# Patient Record
Sex: FEMALE | RURAL
[De-identification: names, ages, dates, MRNs, and addresses within clinical notes are randomized per-mention and may not be internally consistent; named-entity substitution may affect disease eponyms.]

---

## 2020-09-22 ENCOUNTER — OFFICE VISIT (OUTPATIENT)
Dept: RURAL CLINIC 6 | Facility: CLINIC | Age: 40
End: 2020-09-22

## 2020-09-24 ENCOUNTER — DASHBOARD ENCOUNTERS (OUTPATIENT)
Age: 40
End: 2020-09-24

## 2020-09-24 ENCOUNTER — TELEPHONE ENCOUNTER (OUTPATIENT)
Dept: URBAN - METROPOLITAN AREA CLINIC 92 | Facility: CLINIC | Age: 40
End: 2020-09-24

## 2020-09-24 ENCOUNTER — OFFICE VISIT (OUTPATIENT)
Dept: URBAN - NONMETROPOLITAN AREA CLINIC 2 | Facility: CLINIC | Age: 40
End: 2020-09-24
Payer: COMMERCIAL

## 2020-09-24 DIAGNOSIS — K59.01 CONSTIPATION: ICD-10-CM

## 2020-09-24 DIAGNOSIS — F41.0 PANIC ATTACKS: ICD-10-CM

## 2020-09-24 DIAGNOSIS — Z87.19 HISTORY OF ANAL FISSURES: ICD-10-CM

## 2020-09-24 DIAGNOSIS — L29.0 PRURITUS ANI: ICD-10-CM

## 2020-09-24 DIAGNOSIS — E80.4 GILBERT'S DISEASE: ICD-10-CM

## 2020-09-24 DIAGNOSIS — R19.8 DEFECATION SYMPTOM: ICD-10-CM

## 2020-09-24 DIAGNOSIS — Z86.19 HISTORY OF PARASITIC INFECTION: ICD-10-CM

## 2020-09-24 PROBLEM — 27503000 GILBERT'S DISEASE: Status: ACTIVE | Noted: 2020-09-24

## 2020-09-24 PROBLEM — 371631005 PANIC DISORDER: Status: ACTIVE | Noted: 2020-09-24

## 2020-09-24 PROBLEM — 161413004 HISTORY OF INFECTIOUS DISEASE: Status: ACTIVE | Noted: 2020-09-24

## 2020-09-24 PROBLEM — 90446007 PRURITUS ANI: Status: ACTIVE | Noted: 2020-09-24

## 2020-09-24 PROBLEM — 266997008 HISTORY OF GASTROINTESTINAL DISEASE: Status: ACTIVE | Noted: 2020-09-24

## 2020-09-24 PROCEDURE — G8427 DOCREV CUR MEDS BY ELIG CLIN: HCPCS | Performed by: INTERNAL MEDICINE

## 2020-09-24 PROCEDURE — 99203 OFFICE O/P NEW LOW 30 MIN: CPT | Performed by: INTERNAL MEDICINE

## 2020-09-24 PROCEDURE — G8420 CALC BMI NORM PARAMETERS: HCPCS | Performed by: INTERNAL MEDICINE

## 2020-09-24 PROCEDURE — G9903 PT SCRN TBCO ID AS NON USER: HCPCS | Performed by: INTERNAL MEDICINE

## 2020-09-24 PROCEDURE — 1036F TOBACCO NON-USER: CPT | Performed by: INTERNAL MEDICINE

## 2020-09-24 RX ORDER — HYDROCORTISONE 25 MG/G
1 APPLICATION CREAM TOPICAL TWICE A DAY
Qty: 1 | Refills: 0 | OUTPATIENT
Start: 2020-09-24 | End: 2020-10-24

## 2020-09-24 RX ORDER — LUBIPROSTONE 8 UG/1
1 CAPSULE WITH FOOD AND WATER CAPSULE, GELATIN COATED ORAL TWICE A DAY
Qty: 60 CAPSULE | Refills: 0 | OUTPATIENT
Start: 2020-09-24 | End: 2020-10-24

## 2020-09-24 RX ORDER — SERTRALINE 50 MG/1
1 TABLET TABLET, FILM COATED ORAL ONCE A DAY
Status: ACTIVE | COMMUNITY

## 2020-09-24 RX ORDER — ALPRAZOLAM 0.5 MG/1
AS DIRECTED TABLET ORAL PRN
Status: ACTIVE | COMMUNITY

## 2020-09-24 NOTE — HPI-TODAY'S VISIT:
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 anti fungal and steroidal creams wo rx has also been rxed for pinworms wo rx. has been seeing a homeopath for  irregular bms. on the supplements, has a qd bm. but, incomplete evacuation. the supplements are extremely expensive and she cannot continue these agents. wo the rx, she will have a bm q 2-3 days.  she notes she passed a lg parasite wks ago has been rxed by that person w a parasite "cleanse"

## 2020-09-24 NOTE — PHYSICAL EXAM GASTROINTESTINAL
Abdomen,  soft, nontender, nondistended,  no guarding or rigidity,  no masses palpable,  normal bowel sounds,  Liver and Spleen,  no hepatomegaly present,  no hepatosplenomegaly,  liver nontender,  spleen not palpable, rectal-sm post fissure

## 2020-09-25 ENCOUNTER — TELEPHONE ENCOUNTER (OUTPATIENT)
Dept: URBAN - METROPOLITAN AREA CLINIC 92 | Facility: CLINIC | Age: 40
End: 2020-09-25

## 2020-09-25 RX ORDER — LINACLOTIDE 72 UG/1
1 CAPSULE AT LEAST 30 MINUTES BEFORE THE FIRST MEAL OF THE DAY ON AN EMPTY STOMACH CAPSULE, GELATIN COATED ORAL ONCE A DAY
Qty: 90 | Refills: 0 | OUTPATIENT
Start: 2020-09-26 | End: 2020-12-24

## 2020-09-26 PROBLEM — 14760008 CONSTIPATION: Status: ACTIVE | Noted: 2020-09-24
